# Patient Record
Sex: MALE | Race: WHITE | Employment: OTHER | URBAN - METROPOLITAN AREA
[De-identification: names, ages, dates, MRNs, and addresses within clinical notes are randomized per-mention and may not be internally consistent; named-entity substitution may affect disease eponyms.]

---

## 2017-04-27 PROCEDURE — 88305 TISSUE EXAM BY PATHOLOGIST: CPT | Performed by: COLON & RECTAL SURGERY

## 2018-03-20 ENCOUNTER — OFFICE VISIT (OUTPATIENT)
Dept: SURGERY | Facility: CLINIC | Age: 78
End: 2018-03-20

## 2018-03-20 VITALS
HEART RATE: 70 BPM | WEIGHT: 155 LBS | SYSTOLIC BLOOD PRESSURE: 145 MMHG | BODY MASS INDEX: 22 KG/M2 | DIASTOLIC BLOOD PRESSURE: 79 MMHG

## 2018-03-20 DIAGNOSIS — L29.0 PRURITUS ANI: Primary | ICD-10-CM

## 2018-03-20 DIAGNOSIS — K62.89 ANAL IRRITATION: ICD-10-CM

## 2018-03-20 DIAGNOSIS — K64.2 PROLAPSED INTERNAL HEMORRHOIDS, GRADE 3: ICD-10-CM

## 2018-03-20 DIAGNOSIS — Z80.0 FAMILY HISTORY OF COLON CANCER: ICD-10-CM

## 2018-03-20 PROCEDURE — 99213 OFFICE O/P EST LOW 20 MIN: CPT | Performed by: COLON & RECTAL SURGERY

## 2018-03-20 NOTE — PROCEDURES
Procedure:  Anoscopy    Surgeon: Vivi Munoz    Anesthesia: None    Findings: See the progress note attached for all findings    Operative Summary: The patient was placed in a prone position on the proctoscopy table, the hips were flexed in the jackknife p

## 2018-03-20 NOTE — PATIENT INSTRUCTIONS
This patient presents with a new concern of rectal irritation. The patient states that this started approximately 3 weeks ago. He started to feel a sticky sensation around the anus. He states that it causes discomfort. It is constant.   It is not asso

## 2018-03-20 NOTE — H&P
New Patient Visit Note       Active Problems      1. Pruritus ani    2. Family history of colon cancer    3.  Anal irritation        Chief Complaint   Patient presents with:  Anal / Rectal Problem: RECTAL IRRITATION, had colonosocpy 4/2017 by Fátima Reynolds, feels sti 3 WEEKS AGO   How often does it happen? ALWAYS   If you had pain, what was the pain at its worst? 0   Are you having any pain today? 0     Do you have any blood on the stool? no   Do you have any blood on the toilet paper?  no   Do you have any blood in the history as detailed above. I have made all appropriate changes in documentation as necessary  I attest to the accuracy of this note as detailed above    Ish Jason MD [facility] has No Known Allergies.     Past Medical / Agu[place] Glad nausea and vomiting. Genitourinary: Negative for difficulty urinating, dysuria, frequency and urgency. Musculoskeletal: Negative for arthralgias and myalgias. Skin: Negative for color change and rash.    Neurological: Negative for tremors, syncope and There are large grade 3 internal hemorrhoids present. Prostate is normal.  There are no fissures or fistulae. There is no evidence of Crohn's disease or proctitis. There are no mass lesions on rectal examination.      Lymphadenopathy:     He has no cervi colonoscopy performed by myself in April 2017. There was one polyp identified. The patient undergoes routine colonoscopy every 3 years due to a sister with colon cancer. Clinical exam:  The patient is awake, alert, in no acute distress.   His lungs are

## 2018-05-02 ENCOUNTER — TELEPHONE (OUTPATIENT)
Dept: SURGERY | Facility: CLINIC | Age: 78
End: 2018-05-02

## 2018-05-03 ENCOUNTER — TELEPHONE (OUTPATIENT)
Dept: SURGERY | Facility: CLINIC | Age: 78
End: 2018-05-03

## 2018-05-24 ENCOUNTER — TELEPHONE (OUTPATIENT)
Dept: SURGERY | Facility: CLINIC | Age: 78
End: 2018-05-24

## 2018-05-24 NOTE — TELEPHONE ENCOUNTER
Pt calling about puritis ani and has not had any relief. He tried the anal pram x3 applications and states it only made it much worse. He does not believe the diet has any effect on the condition.  He believes he has an infection and needs a course of Cipro

## 2018-06-05 ENCOUNTER — OFFICE VISIT (OUTPATIENT)
Dept: SURGERY | Facility: CLINIC | Age: 78
End: 2018-06-05

## 2018-06-05 VITALS
WEIGHT: 155 LBS | BODY MASS INDEX: 22 KG/M2 | SYSTOLIC BLOOD PRESSURE: 138 MMHG | HEART RATE: 61 BPM | DIASTOLIC BLOOD PRESSURE: 73 MMHG

## 2018-06-05 DIAGNOSIS — L29.0 PRURITUS ANI: Primary | ICD-10-CM

## 2018-06-05 DIAGNOSIS — K64.2 PROLAPSED INTERNAL HEMORRHOIDS, GRADE 3: ICD-10-CM

## 2018-06-05 DIAGNOSIS — K62.89 ANAL IRRITATION: ICD-10-CM

## 2018-06-05 PROCEDURE — 99213 OFFICE O/P EST LOW 20 MIN: CPT | Performed by: COLON & RECTAL SURGERY

## 2018-06-05 RX ORDER — FLUCONAZOLE 200 MG/1
200 TABLET ORAL DAILY
Qty: 14 TABLET | Refills: 1 | Status: SHIPPED | OUTPATIENT
Start: 2018-06-05 | End: 2018-07-03

## 2018-06-05 NOTE — PROGRESS NOTES
Follow Up Visit Note       Active Problems      1. Pruritus ani    2. Prolapsed internal hemorrhoids, grade 3    3.  Anal irritation          Chief Complaint   Patient presents with:  Anal Problem (GI): EST PT puritis ani - no relief, still has some stickin Past Surgical History:  4/27/2017: COLONOSCOPY N/A      Comment: Procedure: COLONOSCOPY, POSSIBLE BIOPSY,                POSSIBLE POLYPECTOMY [id number];  Surgeon: Amarilis Davis MD;  Location: [facility] date: OTHER SURGICAL HISTORY change and rash. Neurological: Negative for tremors, syncope and weakness. Hematological: Negative for adenopathy. Does not bruise/bleed easily. Psychiatric/Behavioral: Negative for behavioral problems and sleep disturbance.         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 He is chronically on Plavix and aspirin. He will not be able to get off these medications for any prolonged period of time.     Clinical exam was limited to an external exam by patient preferenc

## 2018-06-05 NOTE — PATIENT INSTRUCTIONS
This patient has moderate to severe pruritus ani. He has tried several remedies. At his last office visit we started him on Analpram.    The patient was given the pruritus ani instruction sheet.   This has dietary and hygienic modifications that should im treatment of his large grade 3 internal hemorrhoids. We had a long conversation regarding this. He states he could \"take this to his grave\" if he has to. I have asked the patient to return to my attention if the Diflucan does not work.

## 2018-06-26 ENCOUNTER — TELEPHONE (OUTPATIENT)
Dept: SURGERY | Facility: CLINIC | Age: 78
End: 2018-06-26

## 2018-06-26 NOTE — TELEPHONE ENCOUNTER
Pt calling to state the area around his anus is still very red and irritated. He took the diflucan for 8 days without any relief. He would like to try the \"salt water\" treatment that the doctor mentioned at his last appt.  Instructions given for \"Instant

## 2020-09-24 ENCOUNTER — OFFICE VISIT (OUTPATIENT)
Dept: SURGERY | Facility: CLINIC | Age: 80
End: 2020-09-24
Payer: MEDICARE

## 2020-09-24 VITALS — TEMPERATURE: 98 F

## 2020-09-24 DIAGNOSIS — Z01.818 PRE-OP TESTING: ICD-10-CM

## 2020-09-24 DIAGNOSIS — L29.0 PRURITUS ANI: ICD-10-CM

## 2020-09-24 DIAGNOSIS — Z80.0 FAMILY HISTORY OF COLON CANCER: ICD-10-CM

## 2020-09-24 DIAGNOSIS — K57.30 DIVERTICULOSIS OF COLON: ICD-10-CM

## 2020-09-24 DIAGNOSIS — D12.6 ADENOMATOUS POLYP OF COLON, UNSPECIFIED PART OF COLON: Primary | ICD-10-CM

## 2020-09-24 DIAGNOSIS — K55.20: ICD-10-CM

## 2020-09-24 PROBLEM — K63.5 COLON POLYPS: Status: ACTIVE | Noted: 2020-09-24

## 2020-09-24 PROCEDURE — 99213 OFFICE O/P EST LOW 20 MIN: CPT | Performed by: COLON & RECTAL SURGERY

## 2020-09-24 RX ORDER — POLYETHYLENE GLYCOL 3350, SODIUM CHLORIDE, SODIUM BICARBONATE, POTASSIUM CHLORIDE 420; 11.2; 5.72; 1.48 G/4L; G/4L; G/4L; G/4L
POWDER, FOR SOLUTION ORAL
Qty: 1 BOTTLE | Refills: 0 | Status: CANCELLED | OUTPATIENT
Start: 2020-09-24

## 2020-09-24 RX ORDER — SODIUM, POTASSIUM,MAG SULFATES 17.5-3.13G
SOLUTION, RECONSTITUTED, ORAL ORAL
Qty: 1 KIT | Refills: 0 | Status: SHIPPED | OUTPATIENT
Start: 2020-09-24

## 2020-10-20 ENCOUNTER — APPOINTMENT (OUTPATIENT)
Dept: LAB | Age: 80
End: 2020-10-20
Attending: COLON & RECTAL SURGERY
Payer: MEDICARE

## 2020-10-20 DIAGNOSIS — Z01.818 PRE-OP TESTING: ICD-10-CM

## 2020-11-19 ENCOUNTER — MED REC SCAN ONLY (OUTPATIENT)
Dept: SURGERY | Facility: CLINIC | Age: 80
End: 2020-11-19

## 2020-11-19 ENCOUNTER — PATIENT OUTREACH (OUTPATIENT)
Dept: SURGERY | Facility: CLINIC | Age: 80
End: 2020-11-19

## 2020-12-16 ENCOUNTER — TELEPHONE (OUTPATIENT)
Dept: SURGERY | Facility: CLINIC | Age: 80
End: 2020-12-16

## 2025-03-21 ENCOUNTER — TELEPHONE (OUTPATIENT)
Facility: LOCATION | Age: 85
End: 2025-03-21

## 2025-03-21 NOTE — TELEPHONE ENCOUNTER
Patient called to schedule appointment for bleeding hemorrhoids with Dr. Hoang. Says that it's pretty bad, I advised that he can go to Urgent Care or the ER if it is very bad. He scheduled for next available, but would like to be seen sooner than 5/7.     Please advise  Best callback number is [phone]    Future Appointments   Date Time Provider Department Center   5/7/2025  2:45 PM Chris Hoang MD [id number]

## 2025-03-24 ENCOUNTER — TELEPHONE (OUTPATIENT)
Facility: LOCATION | Age: 85
End: 2025-03-24

## 2025-03-24 NOTE — TELEPHONE ENCOUNTER
Spoke with patient, who states he only wants appointment with Dr. Hoang.  Offered appt on 5/1/25.  Patient declined, and states he will keep appt on 5/7/25.    Future Appointments   Date Time Provider Department Center   5/7/2025  2:45 PM Chris Hoang MD [id number]

## 2025-03-25 NOTE — TELEPHONE ENCOUNTER
Patient calling back, and asks to r/s to sooner appt with any provider.  Future Appointments   Date Time Provider Department Center   4/22/2025  1:30 PM Mily Monsivais MD [id number]

## 2025-03-25 NOTE — TELEPHONE ENCOUNTER
Spoke with patient, who wanted to discuss possible sooner appt with [facility].  Ultimately patient decided to keep appt 5/7/25.    Future Appointments   Date Time Provider Department Center   5/7/2025  2:45 PM [id number]